# Patient Record
(demographics unavailable — no encounter records)

---

## 2024-12-18 NOTE — DISCUSSION/SUMMARY
[FreeTextEntry1] : 2 year old female presents with resolved AOM.  Ear exam WNL.  Mother to take Zadie to allergist for allergy testing r/t reaction with Cefzil.

## 2024-12-18 NOTE — HISTORY OF PRESENT ILLNESS
[FreeTextEntry6] : Patient's presents for ear re-check post completion of antibiotics diagnosed at [facility].  Had R AOM.  Denies ear pain pain/ ear pulling.  Denies cough and congestion.   Fever: none   Meds given: Stopped Cefzil due to rash. Completed course of Azithromycin.  Appetite/activity at baseline, drinking well, good UO. No vomiting/No diarrhea.

## 2025-03-04 NOTE — HISTORY OF PRESENT ILLNESS
[Parents] : parents [Fruit] : fruit [Vegetables] : vegetables [Meat] : meat [Grains] : grains [Eggs] : eggs [Dairy] : dairy [Vitamin] : Patient takes vitamin daily [Normal] : Normal [In bed] : In bed [Pacifier use] : Pacifier use [Sippy cup use] : Sippy cup use [Brushing teeth] : Brushing teeth [Yes] : Patient goes to dentist yearly [Toothpaste] : Primary Fluoride Source: Toothpaste [Playtime (60 min/d)] : Playtime 60 min a day [Appropiate parent-child communication] : Appropriate parent-child communication [Child given choices] : Child given choices [Parent has appropriate responses to behavior] : Parent has appropriate responses to behavior [No] : Not at  exposure [Water heater temperature set at <120 degrees F] : Water heater temperature set at <120 degrees F [Car seat in back seat] : Car seat in back seat [Smoke Detectors] : Smoke detectors [Supervised play near cars and streets] : Supervised play near cars and streets [Carbon Monoxide Detectors] : Carbon monoxide detectors [Exposure to electronic nicotine delivery system] : Exposure to electronic nicotine delivery system [Up to date] : Up to date [FreeTextEntry7] : [facility] 3 years  [de-identified] : none  [FreeTextEntry9] : art class, library classes

## 2025-03-04 NOTE — PHYSICAL EXAM

## 2025-03-04 NOTE — DEVELOPMENTAL MILESTONES

## 2025-03-04 NOTE — DISCUSSION/SUMMARY
[FreeTextEntry1] : 3 year old female presents for WCC. Appears clinically well. Decline in height percentile- Return in 6 months for height check.   No developmental, elimination, feeding, skin and sleep concerns.   Continue balanced diet with all food groups. Brush teeth twice a day with toothbrush. Recommend visit to dentist. As per car seat 's guidelines, use forward-facing car seat in back seat of car. Switch to booster seat when child reaches highest weight/height for seat. Child needs to ride in a belt-positioning booster seat until  4 feet 9 inches has been reached and are between 8 and 12 years of age. Put toddler to sleep in own bed. Help toddler to maintain consistent daily routines and sleep schedule. Pre-K discussed. Ensure home is safe. Use consistent, positive discipline. Read aloud to toddler. Limit screen time to no more than 2 hours per day.   Return in 6 months for height re-check.  [facility]CC at 4 years.